# Patient Record
Sex: FEMALE | URBAN - METROPOLITAN AREA
[De-identification: names, ages, dates, MRNs, and addresses within clinical notes are randomized per-mention and may not be internally consistent; named-entity substitution may affect disease eponyms.]

---

## 2022-04-04 ENCOUNTER — APPOINTMENT (RX ONLY)
Dept: URBAN - METROPOLITAN AREA CLINIC 166 | Facility: CLINIC | Age: 42
Setting detail: DERMATOLOGY
End: 2022-04-04

## 2022-04-04 DIAGNOSIS — L30.0 NUMMULAR DERMATITIS: ICD-10-CM

## 2022-04-04 DIAGNOSIS — B00.1 HERPESVIRAL VESICULAR DERMATITIS: ICD-10-CM

## 2022-04-04 DIAGNOSIS — L30.1 DYSHIDROSIS [POMPHOLYX]: ICD-10-CM

## 2022-04-04 PROCEDURE — 99203 OFFICE O/P NEW LOW 30 MIN: CPT

## 2022-04-04 PROCEDURE — ? PRESCRIPTION

## 2022-04-04 PROCEDURE — ? TREATMENT REGIMEN

## 2022-04-04 PROCEDURE — ? COUNSELING

## 2022-04-04 RX ORDER — TACROLIMUS 1 MG/G
OINTMENT TOPICAL
Qty: 30 | Refills: 2 | Status: ERX

## 2022-04-04 ASSESSMENT — LOCATION SIMPLE DESCRIPTION DERM
LOCATION SIMPLE: LEFT PRETIBIAL REGION
LOCATION SIMPLE: RIGHT PLANTAR SURFACE
LOCATION SIMPLE: RIGHT BUTTOCK

## 2022-04-04 ASSESSMENT — LOCATION ZONE DERM
LOCATION ZONE: TRUNK
LOCATION ZONE: FEET
LOCATION ZONE: LEG

## 2022-04-04 ASSESSMENT — LOCATION DETAILED DESCRIPTION DERM
LOCATION DETAILED: LEFT PROXIMAL PRETIBIAL REGION
LOCATION DETAILED: RIGHT MEDIAL PLANTAR MIDFOOT
LOCATION DETAILED: RIGHT MEDIAL BUTTOCK

## 2022-04-04 NOTE — HPI: RASH
What Type Of Note Output Would You Prefer (Optional)?: Bullet Format
Is This A New Presentation, Or A Follow-Up?: Rash
Additional History: Pt reports rash on buttocks that flares 2-4times per year, has occurred last 10 years. Rash on leg is very itchy. Rash on the leg appeared during the time her son got ring ringworm. Rash on foot is more painful, shows signs of puss filled lesions.

## 2022-04-04 NOTE — PROCEDURE: TREATMENT REGIMEN
Plan: We discussed stopping Humira (with her GI specialist approval) for a time to see if that helps resolve the flare ups, because pt is very concerned that the flares may be related. I explained that I was strongly suspicious of this being HSV flares and these are likely to continue irregardless of the Humira. \\n\\nBiopsies were not conclusive but I advised her to follow up for a viral swab next time the lesions flares. Consider adding Valtrex for flares.
Detail Level: Simple
Initiate Treatment: tacrolimus 0.1 % topical ointment Use on affected areas of leg and foot twice daily
Plan: Pt has failed topical steroids but recommended trial under Saran Wrap occlusion for 2-3 weeks. \\n\\nWe will try the tacrolimus alternative to see if this improves. \\nI did explain that the eczematous rash on the foot and leg appear consistent eczema and that this may be chronic (requiring off and on use of topicals). \\n\\nWe discussed possibility that these skin findings may be related to Humira although psoriasis would be more likely. The way to determine the connection would be to hold the Humira for 3-6 months and see if these eczematous patches clear. Pt is very bothered by the itching of these and explains that she doesn’t want to live with these. I did mention that Humira and other immune related systemic meds may affect inflammatory rashes but difficult to determine this. The biopsy report from the leg showed eczema changes and I would expect a repeat biospy of either site would show the same. I offered repeat biopsy but patient agreed to monitor for now.
Plan: See discussion above. Biopsy supports eczema. Clinically consistent with eczema. Difficult to say if this has any real relation to the Humira. It responds to steroids but then recurs. \\n\\nWe discussed repeat biopsy if desired but I anticipate this will show the same findings.
Initiate Treatment: tacrolimus 0.1 % topical ointment Use on affected areas of leg and foot twice daily.

## 2022-04-27 ENCOUNTER — RX ONLY (OUTPATIENT)
Age: 42
Setting detail: RX ONLY
End: 2022-04-27

## 2022-04-27 RX ORDER — HALOBETASOL PROPIONATE AND TAZAROTENE .1; .45 MG/G; MG/G
LOTION TOPICAL
Qty: 1 | Refills: 2 | Status: ERX

## 2022-05-02 ENCOUNTER — APPOINTMENT (RX ONLY)
Dept: URBAN - METROPOLITAN AREA CLINIC 170 | Facility: CLINIC | Age: 42
Setting detail: DERMATOLOGY
End: 2022-05-02

## 2022-05-02 DIAGNOSIS — B00.1 HERPESVIRAL VESICULAR DERMATITIS: ICD-10-CM

## 2022-05-02 DIAGNOSIS — L80 VITILIGO: ICD-10-CM

## 2022-05-02 DIAGNOSIS — L30.8 OTHER SPECIFIED DERMATITIS: ICD-10-CM

## 2022-05-02 DIAGNOSIS — L259 CONTACT DERMATITIS AND OTHER ECZEMA, UNSPECIFIED CAUSE: ICD-10-CM

## 2022-05-02 DIAGNOSIS — L30.1 DYSHIDROSIS [POMPHOLYX]: ICD-10-CM

## 2022-05-02 PROBLEM — L81.9 DISORDER OF PIGMENTATION, UNSPECIFIED: Status: ACTIVE | Noted: 2022-05-02

## 2022-05-02 PROBLEM — L30.9 DERMATITIS, UNSPECIFIED: Status: ACTIVE | Noted: 2022-05-02

## 2022-05-02 PROCEDURE — ? TREATMENT REGIMEN

## 2022-05-02 PROCEDURE — ? OTHER

## 2022-05-02 PROCEDURE — 99214 OFFICE O/P EST MOD 30 MIN: CPT | Mod: 25

## 2022-05-02 PROCEDURE — ? PRESCRIPTION

## 2022-05-02 PROCEDURE — ? COUNSELING

## 2022-05-02 PROCEDURE — ? BIOPSY BY PUNCH METHOD

## 2022-05-02 PROCEDURE — 11104 PUNCH BX SKIN SINGLE LESION: CPT

## 2022-05-02 RX ORDER — RUXOLITINIB 15 MG/G
CREAM TOPICAL
Qty: 60 | Refills: 11 | Status: ERX

## 2022-05-02 ASSESSMENT — LOCATION DETAILED DESCRIPTION DERM
LOCATION DETAILED: RIGHT MEDIAL PLANTAR MIDFOOT
LOCATION DETAILED: LEFT PROXIMAL PRETIBIAL REGION
LOCATION DETAILED: LEFT UPPER CUTANEOUS LIP
LOCATION DETAILED: LEFT FOREHEAD
LOCATION DETAILED: RIGHT BUTTOCK
LOCATION DETAILED: RIGHT INFERIOR UPPER BACK
LOCATION DETAILED: LEFT INFERIOR UPPER BACK

## 2022-05-02 ASSESSMENT — LOCATION ZONE DERM
LOCATION ZONE: FACE
LOCATION ZONE: FEET
LOCATION ZONE: LEG
LOCATION ZONE: TRUNK
LOCATION ZONE: LIP

## 2022-05-02 ASSESSMENT — LOCATION SIMPLE DESCRIPTION DERM
LOCATION SIMPLE: RIGHT BUTTOCK
LOCATION SIMPLE: RIGHT UPPER BACK
LOCATION SIMPLE: LEFT UPPER BACK
LOCATION SIMPLE: LEFT LIP
LOCATION SIMPLE: LEFT PRETIBIAL REGION
LOCATION SIMPLE: RIGHT PLANTAR SURFACE
LOCATION SIMPLE: LEFT FOREHEAD

## 2022-05-02 NOTE — PROCEDURE: OTHER
Render Risk Assessment In Note?: no
Other (Free Text): 4/4/22 saw Dr. Mckeon\\nDx'd with Dishydrotic eczema\\nstarted acrolimus 0.1 % topical ointment on leg and foot twice daily.\\nPt has failed topical steroids but recommended trial under Saran Wrap occlusion for 2-3 weeks.\\nWe will try the tacrolimus alternative to see if this improves.\\nI did explain that the eczematous rash on the foot and leg appear consistent eczema and that this may be chronic (requiring off and on use of topicals).\\nWe discussed possibility that these skin findings may be related to Humira although psoriasis would be more likely. \\nThe way to determine the connection would be to hold the Humira for 3-6 months and see if these eczematous patches clear. \\nPt is very bothered by the itching of these and explains that she doesn’t want to live with these. \\nI did mention that Humira and other immune related systemic meds may affect inflammatory rashes but difficult to determine this. \\nThe biopsy report from the leg showed eczema changes and I would expect a repeat biospy of either site would show the same. \\nI offered repeat biopsy but patient agreed to monitor for now.\\n\\n5/2/22\\nDishydrotic eczema on right plantar surface\\nused Tacrolimus 0.1% ointment  \\nFinished 2 week tx \\nNot clearing up - chronic, not stable\\n\\nHas upcoming appt with rheumatologist on May 23rd for joint pain - she is worried that she has psoriatic arthritis\\n- used to be a runner\\n- getting a pain in her R foot\\n- has joint pain\\n- had enthesitis on R pinkie finger\\nHad steroid shots \\nHas been negative for RA\\n\\nWants to make sure it is not psoriasis because she knows that Humira can cause psoriasis (even though it is used to treat psoriasis)\\nWould like to do a bx to r/o psoriasis on her foot\\nI explained how it looked more like dishydrotic eczema and that it can be sensitive doing a punch bx on the foot due to the stitches (and that they might pull open)\\nIt also looks like it is drying up\\nshe has a red patch with mostly dried up blisters\\nAlso, the rash could look different under the microscope after it has been treated\\nHas not been treated for 3 weeks (she only used tacrolimus for 2 weeks)\\nI was going to bring her back another day to do the bx, as I had already spent a lot of time discussing her conditions and reviewing her chart, but she lives very far away and scheduled specifically for the bx (it probably would have been better if she scheduled with Dr. Mckeon since he was already familiar with her situation)\\n\\nI recommend continuing topical treatment\\nI recommend trying Opzelura to see if it works better/faster for the itching and the actual rash\\nI don't think this looks like Humira-induced psoriasis
Detail Level: Detailed
Note Text (......Xxx Chief Complaint.): This diagnosis correlates with the
Other (Free Text): I reviewed her past notes:\\n\\n2/9/22 Bx at [facility]\\nMild spongiotic dermatitis\\nDDx included ACD, AD, nummular derm, dyshidrotic derm, id reaction, and p. Rosea\\n\\n1/13/21 Bx at [facility] R buttocks\\nr/o LP, cutenous crohns, etc.\\nInterface dermatitis\\nDDx EM, LP, drug reaction, or CTD\\n\\n10/9/20 Bx at [facility] R medial buttocks\\nDDx LP, drug reaction, CTD, HSV1 and 2 were negative\\n\\nRx'd  betamethasone\\n\\n----------\\n\\n4/4/22 saw Dr. Mckeon\\nDx'd with Nummular eczema\\nBegin tacrolimus 0.1 % topical ointment on leg and foot twice daily.\\nSee discussion above.  Biopsy supports eczema. Clinically consistent with eczema. \\nDifficult to say if this has any real relation to the Humira. \\nIt responds to steroids but then recurs.\\nWe discussed repeat biopsy if desired but I anticipate this will show the same findings.\\n\\n4/26/22 message to Dr. Mckeon\\nPatient called because she has been using the tacrolimus 0.1 % topical ointment and CeraVe regularly BID. \\nShe says they did take away some of the redness and bumps but the area is still scaly and slightly painful. \\nThe area on the calf does itch on occasion as well. \\nShe is concerned the bumps and redness will come back if she was to cut back or change the creams. \\nShe says she was told to call if not working and is requesting a change in medication or to see what else you would recommend? Please advise\\n\\n4/26/22 Communication note from Dr. Mckeon\\nI tried calling pt and left a message. \\nI offered to try sending Duobrii as an alternative but as discussed at length in our visit and in my note, I explained that if she has developed a more chronic eczema that\\nthe topical creams are not a cure for the condition but used to minimize flares and manage the condition. \\nThe patient was seeking a cure and lasting solution for these rashes and I warned that this may not be realistic. \\nShe has tried topical steroids which do work temporarily but she if frustrated by the lack of lasting response. \\nShe was also concerned about These rashes being from the Humira she is on. \\nI explained that it is possible but that the way to know that for sure would be to hold the Humira for 2-4 months and watch for a response and advised her to discuss this possibility with her GI specialist. \\nGiven the limited nature of the rashes I did not strongly encourage this as the Humira seems to be working well for her GI.\\n\\n------\\n\\n5/2/22\\n\\nThe rash on the buttocks that was biopsied twice was different from the current rashes on her hands, foot, and leg\\n\\nLeft leg - the area that was bx'd on 2/9/22 (Mild spongiotic dermatitis,  DDx included ACD, AD, nummular derm, dyshidrotic derm, id reaction, and p. Rosea)\\nhas been using Tacrolimus 0.1% ointment from Dr. Mckeon (4/4/22 visit)\\nFinished the 2 week tx \\nWith the tacrolimus, the large scales improved (xerosis)\\nThe rash is also much better\\nStill itchy, but is better than before \\nUses CeraVe cream - but is still very dry \\n\\nwas mildly flared at that visit\\nStill is slightly red - chronic, not stable\\nBut the R leg is also a little red\\nShe only has symptoms (itching, dryness, redness) on the L side\\n\\nUsed steroids in the past (betamethasone on L shin x almost 1 year, HC 2.5% on buttocks)\\nWould calm it down, would almost go away, then she stopped, and the rash would recur\\n\\nStarted in the summer 2021\\nFirst thought it was a fungus\\nThen switched to a steroid cream\\nThen went in during the winter\\n\\nRecently saw an allergist\\n[facility]\\nDid prick testing\\nThought they were more immune related\\nGets rashes in scalp and ears from time to time\\nI suspect poss ACD\\nRefer to [facility] for patch testing since she is not clearing and she gets additional rashes on and off\\nI don't think this looks like Humira-induced psoriasis, and the biopsy does not support \\nit looks eczematous, which can include ACD, and the biopsy supports it\\n\\nRecc starting Opzelura BID\\nalso ok to con't tacrolimus
Other (Free Text): 5/2/22\\nWould like to discuss possible vitiligo diagnosis \\nHer previous dermatologist said it was not psoriasis or vitiligo \\nShe suspects it was induced by her Humira - Has UC\\nBoth of her sons have vitiligo \\n\\nShe has 2 small depigmented patches (about 1 cm each)\\nNo known trauma/rash there\\n\\nShe wants to prove that this is (or isn't) vitiligo\\nI explained how a biopsy would likely show just exactly what I am seeing - a lack of pigment\\nHowever, this won't confirm or deny a dx of vitiligo - it is also a clinical diagnosis\\nI cannot completely exclude vitiligo because the patches are depigmented\\nHowever, she does not have more extensive patches of depigmentation, especially in areas characteristic of vitiligo (such as around orifices and acral surfaces) to support the diagnosis of vitiligo\\nVitiligo can run it families due to its autoimmune nature, but I don't usually see it extensively in many family members of the same family\\nAlso, if this was vitiligo, I could not assert that Humira was the cause because just the fact that it can run in families means that it could be a coincidence that she is on Humira and has vitiligo - I can't prove or disprove causation.\\n\\nI recommend just treating the spots\\nI don't recommend stopping the Humira\\nshe already has tacrolimus and can use that BID\\nthe Opzelura also may become FDA approved for vitiligo soon - it would also be safe to try that BID\\n
Other (Free Text): I reviewed her past notes:\\n\\n1/13/21 Bx at [facility] buttocks\\nr/o LP, cutenous crohns, etc.\\nInterface dermatitis\\nDDx EM, LP, drug reaction, or CTD\\n\\n10/9/20 Bx at [facility] R medial buttocks\\nDDx LP, drug reaction, CTD, HSV1 and 2 were negative\\n\\nRx'd betamethasone\\n\\n------\\n\\n5/2/22\\nclear today
Other (Free Text): HSV Flares from time to time\\nDr. Linda did not see it before\\nHappens about 1-2 times a year\\nTested twice for HSV \\n- once as a swab of an active lesion\\n- once as a biopsy\\nGets cold sores

## 2022-05-02 NOTE — PROCEDURE: BIOPSY BY PUNCH METHOD
Detail Level: Simple
Was A Bandage Applied: Yes
Punch Size In Mm: 4
Biopsy Type: H and E
Anesthesia Type: 1% lidocaine with epinephrine
Anesthesia Volume In Cc (Will Not Render If 0): 0.5
Additional Anesthesia Volume In Cc (Will Not Render If 0): 0
Hemostasis: None
Epidermal Sutures: 4-0 Nylon
Number Of Epidermal Sutures (Optional): 2
Wound Care: Aquaphor
Dressing: bandage
Suture Removal: 14 days
Patient Will Remove Sutures At Home?: No
Lab: [id number]
Lab Facility: [id number]
Consent: Written consent was obtained and risks were reviewed including but not limited to scarring, infection, bleeding, scabbing, incomplete removal, nerve damage and allergy to anesthesia.
Post-Care Instructions: I reviewed with the patient in detail post-care instructions. Patient is to keep the area dry and covered with the bandage for about 24 hours.  Once the bandage is removed, the patient should wash the site with soap and water, apply Aquaphor or Vasiline and re-cover the site with a bandage once to twice daily until healed.  Keeping a thin layer of ointment on the wound (rather than letting a scab form) helps promote healing. Antibiotic ointments (Neosporin, Polysporin, Bacitracin, Triple Antibiotic) are not needed nor recommended, but may be used if they have been used more than twice in the past without developing an allergic reaction to them. Should the patient develop any fevers, chills, bleeding, severe pain, increased redness/drainage/crust/pus, patient will contact the office immediately.
Home Suture Removal Text: Patient was provided a home suture removal kit and will remove their sutures at home.  If they have any questions or difficulties they will call the office.
Notification Instructions: Patient will be notified of biopsy results. However, patient instructed to call the office if not contacted within 2 weeks.
Billing Type: Third-Party Bill
Information: Selecting Yes will display possible errors in your note based on the variables you have selected. This validation is only offered as a suggestion for you. PLEASE NOTE THAT THE VALIDATION TEXT WILL BE REMOVED WHEN YOU FINALIZE YOUR NOTE. IF YOU WANT TO FAX A PRELIMINARY NOTE YOU WILL NEED TO TOGGLE THIS TO 'NO' IF YOU DO NOT WANT IT IN YOUR FAXED NOTE.

## 2022-05-02 NOTE — PROCEDURE: TREATMENT REGIMEN
Continue Regimen: tacrolimus 0.1% BID
Detail Level: Simple
Initiate Treatment: Opzelura 1.5 % topical cream Apply to affected areas of foot and legs twice daily.
Continue Regimen: tacrolimus 0.1% ointment BID
Detail Level: Zone
Initiate Treatment: Tacrolimus 0.1% ointment apply to affected areas of back and neck twice daily

## 2022-11-22 ENCOUNTER — APPOINTMENT (RX ONLY)
Dept: URBAN - METROPOLITAN AREA CLINIC 166 | Facility: CLINIC | Age: 42
Setting detail: DERMATOLOGY
End: 2022-11-22

## 2022-11-22 DIAGNOSIS — B00.1 HERPESVIRAL VESICULAR DERMATITIS: ICD-10-CM

## 2022-11-22 PROCEDURE — ? COUNSELING

## 2022-11-22 PROCEDURE — ? PRESCRIPTION

## 2022-11-22 PROCEDURE — 99213 OFFICE O/P EST LOW 20 MIN: CPT

## 2022-11-22 PROCEDURE — ? ORDER TESTS

## 2022-11-22 PROCEDURE — ? TREATMENT REGIMEN

## 2022-11-22 RX ORDER — VALACYCLOVIR 500 MG/1
TABLET, FILM COATED ORAL
Qty: 90 | Refills: 4 | Status: ERX | COMMUNITY
Start: 2022-11-22

## 2022-11-22 RX ADMIN — VALACYCLOVIR: 500 TABLET, FILM COATED ORAL at 00:00

## 2022-11-22 ASSESSMENT — LOCATION ZONE DERM: LOCATION ZONE: TRUNK

## 2022-11-22 ASSESSMENT — LOCATION DETAILED DESCRIPTION DERM: LOCATION DETAILED: RIGHT BUTTOCK

## 2022-11-22 ASSESSMENT — LOCATION SIMPLE DESCRIPTION DERM: LOCATION SIMPLE: RIGHT BUTTOCK

## 2022-11-22 NOTE — PROCEDURE: TREATMENT REGIMEN
Initiate Treatment: Given the frequency of her flares, I recommended she start on chronic suppressive dosing of valacyclovir 500 mg tablet daily \\n\\nFor this acute flare take 4 pills by mouth with a full glass of water and repeat in 12 hours.
Detail Level: Detailed

## 2022-11-22 NOTE — PROCEDURE: ORDER TESTS
Lab Facility: [id number]
Bill For Surgical Tray: no
Expected Date Of Service: 11/22/2022
Billing Type: Third-Party Bill
Clinical Notes (To The Lab): Right buttock

## 2022-11-22 NOTE — HPI: RASH
What Type Of Note Output Would You Prefer (Optional)?: Bullet Format
Is This A New Presentation, Or A Follow-Up?: Rash
Additional History: Patient reports that she has been wearing a compression garment following a procedure that may have caused irritation.

## 2023-05-08 ENCOUNTER — RX ONLY (OUTPATIENT)
Age: 43
Setting detail: RX ONLY
End: 2023-05-08

## 2023-05-08 RX ORDER — VALACYCLOVIR 500 MG/1
TABLET, FILM COATED ORAL
Qty: 90 | Refills: 4 | Status: CANCELLED
Stop reason: ENTERED-IN-ERROR

## 2023-06-05 ENCOUNTER — OFFICE VISIT (OUTPATIENT)
Dept: URBAN - METROPOLITAN AREA CLINIC 40 | Facility: CLINIC | Age: 43
End: 2023-06-05
Payer: COMMERCIAL

## 2023-06-05 DIAGNOSIS — H52.4 PRESBYOPIA: Primary | ICD-10-CM

## 2023-06-05 PROCEDURE — 92310 CONTACT LENS FITTING OU: CPT | Performed by: OPTOMETRIST

## 2023-06-05 PROCEDURE — 92004 COMPRE OPH EXAM NEW PT 1/>: CPT | Performed by: OPTOMETRIST

## 2023-06-05 ASSESSMENT — VISUAL ACUITY
OD: 20/20
OS: 20/20

## 2023-06-05 ASSESSMENT — INTRAOCULAR PRESSURE
OD: 15
OS: 15

## 2023-06-05 ASSESSMENT — KERATOMETRY
OS: 43.75
OD: 43.63

## 2023-06-05 NOTE — IMPRESSION/PLAN
Impression: Presbyopia: H52.4. Plan: Discussed diagnosis in detail with patient. New glasses Rx was given today. New CL trials given today. Recommend UV protection. Potential for initial adaptation discussed. Patient will let me know if trials work. It SCL don't work then will try RGP.

## 2023-06-16 ENCOUNTER — APPOINTMENT (RX ONLY)
Dept: URBAN - METROPOLITAN AREA CLINIC 166 | Facility: CLINIC | Age: 43
Setting detail: DERMATOLOGY
End: 2023-06-16

## 2023-06-16 DIAGNOSIS — L91.0 HYPERTROPHIC SCAR: ICD-10-CM

## 2023-06-16 DIAGNOSIS — B00.1 HERPESVIRAL VESICULAR DERMATITIS: ICD-10-CM

## 2023-06-16 PROCEDURE — ? COUNSELING

## 2023-06-16 PROCEDURE — 11900 INJECT SKIN LESIONS </W 7: CPT

## 2023-06-16 PROCEDURE — ? TREATMENT REGIMEN

## 2023-06-16 PROCEDURE — 99213 OFFICE O/P EST LOW 20 MIN: CPT | Mod: 25

## 2023-06-16 PROCEDURE — ? INTRALESIONAL KENALOG

## 2023-06-16 PROCEDURE — ? PRESCRIPTION

## 2023-06-16 RX ORDER — VALACYCLOVIR 500 MG/1
TABLET, FILM COATED ORAL
Qty: 90 | Refills: 4 | Status: ERX

## 2023-06-16 ASSESSMENT — LOCATION SIMPLE DESCRIPTION DERM
LOCATION SIMPLE: RIGHT BUTTOCK
LOCATION SIMPLE: ABDOMEN

## 2023-06-16 ASSESSMENT — LOCATION DETAILED DESCRIPTION DERM
LOCATION DETAILED: RIGHT BUTTOCK
LOCATION DETAILED: PERIUMBILICAL SKIN

## 2023-06-16 ASSESSMENT — LOCATION ZONE DERM: LOCATION ZONE: TRUNK

## 2023-06-16 NOTE — PROCEDURE: TREATMENT REGIMEN
Continue Regimen: Given the frequency of her flares, I recommended she continue on chronic suppressive dosing of valacyclovir 500 mg tablet daily
Plan: She is clear on daily dosing. \\nConfirmed on HSV2 swab.
Detail Level: Detailed

## 2023-06-16 NOTE — PROCEDURE: INTRALESIONAL KENALOG
How Many Mls Were Removed From The 80 Mg/Ml (5ml) Vial When Preparing The Injectable Solution?: 0
Include Z78.9 (Other Specified Conditions Influencing Health Status) As An Associated Diagnosis?: No
Concentration Of Solution Injected (Mg/Ml): 5.0
Validate Note Data When Using Inventory: Yes
Detail Level: Detailed
Medical Necessity Clause: This procedure was medically necessary because the lesions that were treated were:
Consent: The risks of atrophy and discoloration were reviewed with the patient. Pt agrees to treatment.
Kenalog Preparation: Kenalog
Total Volume Injected (Ccs- Only Use Numbers And Decimals): 0.5

## 2023-07-17 ENCOUNTER — APPOINTMENT (RX ONLY)
Dept: URBAN - METROPOLITAN AREA CLINIC 166 | Facility: CLINIC | Age: 43
Setting detail: DERMATOLOGY
End: 2023-07-17

## 2023-07-17 ENCOUNTER — RX ONLY (OUTPATIENT)
Age: 43
Setting detail: RX ONLY
End: 2023-07-17

## 2023-07-17 DIAGNOSIS — L91.0 HYPERTROPHIC SCAR: ICD-10-CM | Status: IMPROVED

## 2023-07-17 PROCEDURE — ? INTRALESIONAL KENALOG

## 2023-07-17 PROCEDURE — 11900 INJECT SKIN LESIONS </W 7: CPT

## 2023-07-17 PROCEDURE — ? COUNSELING

## 2023-07-17 RX ORDER — VALACYCLOVIR 500 MG/1
TABLET, FILM COATED ORAL
Qty: 90 | Refills: 4 | Status: ERX

## 2023-07-17 ASSESSMENT — LOCATION DETAILED DESCRIPTION DERM: LOCATION DETAILED: PERIUMBILICAL SKIN

## 2023-07-17 ASSESSMENT — LOCATION ZONE DERM: LOCATION ZONE: TRUNK

## 2023-07-17 ASSESSMENT — LOCATION SIMPLE DESCRIPTION DERM: LOCATION SIMPLE: ABDOMEN

## 2023-08-16 ENCOUNTER — TESTING ONLY (OUTPATIENT)
Dept: URBAN - METROPOLITAN AREA CLINIC 40 | Facility: CLINIC | Age: 43
End: 2023-08-16

## 2023-08-16 DIAGNOSIS — H52.4 PRESBYOPIA: Primary | ICD-10-CM

## 2023-08-16 PROCEDURE — 92310 CONTACT LENS FITTING OU: CPT | Performed by: OPTOMETRIST

## 2023-08-16 ASSESSMENT — KERATOMETRY
OS: 43.88
OD: 43.63

## 2023-09-20 NOTE — PROCEDURE: INTRALESIONAL KENALOG
How Many Mls Were Removed From The 80 Mg/Ml (5ml) Vial When Preparing The Injectable Solution?: 0
Include Z78.9 (Other Specified Conditions Influencing Health Status) As An Associated Diagnosis?: No
Concentration Of Solution Injected (Mg/Ml): 10.0
Validate Note Data When Using Inventory: Yes
Detail Level: Detailed
Treatment Number (Optional): 2
Medical Necessity Clause: This procedure was medically necessary because the lesions that were treated were:
Lot # (Optional): [id number]
Administered By (Optional): ANNE
Consent: The risks of atrophy and discoloration were reviewed with the patient. Pt agrees to treatment.
Expiration Date (Optional): 8/10/23
Kenalog Preparation: Kenalog
Total Volume Injected (Ccs- Only Use Numbers And Decimals): 0.15
Kenalog Type Of Vial: Multiple Dose
Fair